# Patient Record
(demographics unavailable — no encounter records)

---

## 2025-06-11 NOTE — PHYSICAL EXAM
[No Acute Distress] : no acute distress [Normal Oropharynx] : normal oropharynx [Normal Appearance] : normal appearance [No Neck Mass] : no neck mass [Normal Rate/Rhythm] : normal rate/rhythm [Normal S1, S2] : normal s1, s2 [No Murmurs] : no murmurs [Clear to Auscultation Bilaterally] : clear to auscultation bilaterally [Normal to Percussion] : normal to percussion [No Abnormalities] : no abnormalities [Benign] : benign [No HSM] : no hsm [Normal Gait] : normal gait [No Clubbing] : no clubbing [No Edema] : no edema [No Cyanosis] : no cyanosis [Normal Color/ Pigmentation] : normal color/ pigmentation [FROM] : FROM [No Focal Deficits] : no focal deficits [Normal Affect] : normal affect [Oriented x3] : oriented x3

## 2025-06-11 NOTE — CONSULT LETTER
[Dear  ___] : Dear ~YVONNE, [Consult Closing:] : Thank you very much for allowing me to participate in the care of this patient.  If you have any questions, please do not hesitate to contact me. [Sincerely,] : Sincerely, [FreeTextEntry2] : Rambo Sarkar MD  [FreeTextEntry3] : Anuel Hidalgo MD FCCP\par

## 2025-06-11 NOTE — PROCEDURE
[FreeTextEntry1] : 05/24/2023 Pulmonary function testing FEV1, FVC, and FEV1/FVC are within normal limits.   CT 4/5/24 reviewed compared to prior and discussed.  Stable pulmonary nodules. Coronary calcifications.

## 2025-06-11 NOTE — DISCUSSION/SUMMARY
[FreeTextEntry1] : CAT scan of the chest of April 4, 2024 reviewed  Multiple pulmonary nodules.  Largest nodule 5 to 6 mm left lower lobe does appear to have been present and similar to 2016.  Left lingula nodule also present.  Few other small nodules not definitive if present. Non-smoker. History of breast carcinoma. Coronary atherosclerosis.

## 2025-06-11 NOTE — ASSESSMENT
[FreeTextEntry1] : Follow-up CT discussed and ordered.  Will review and call Sees cardiology. All discussed.   30 minutes spent in evaluation review of multiple studies and discussion.

## 2025-06-11 NOTE — HISTORY OF PRESENT ILLNESS
[Never] : never [TextBox_4] : Feeling well No significant cough, wheezing, chest pain or SOB. No complaints. Feeling well last CT 4/5/24 Saw cardiology  Some allergies.  No issue with breast CA. Followed and doing well. Dr. Ruiz.